# Patient Record
(demographics unavailable — no encounter records)

---

## 2025-04-24 NOTE — HISTORY OF PRESENT ILLNESS
[de-identified] : CHARMAINE LEONARDO is a 44 year male here with bilateral hip pain, R>L. Pain has been present since 5- 10 years but pain has been present since 04/22/25 and is located in the groin and posterior.  Rates pain as 2/10 on pain scale. Injury - No specific injury  Mechanical symptoms - States the right hip clicks depending on movement  Exacerbating factors/activities/positions - Bending- feels tightness that can radiate down to his right foot Pain during or after activity - during activity- Tightness more so the right hip Back pain - Yes lower back- tightness  Radicular pain - Tightness that can radiate down his right leg  Previous Treatment: [facility]- 3 years ago, PCP- referred to see an orthopedic  NSAIDs: Advil, Aleve  PT: None  Activity Mods: N/A  Surgery: None  Injections: None  Occupation:   Sports/Recreational Activities: N/A Additional: goes to the gym 5x a week. States he bought a hip brace 1.5 months ago which help stabilized his left hip.                                                                     *States he is a recovering drug addict*

## 2025-04-24 NOTE — IMAGING
[de-identified] : General: NAD, A&Ox3 Gait: Non-antalgic, no Trendelenburg Foot Progression: neutral Knee Progression: neutral   B/L Hip Exam: Pain with Log Roll - negative Flexion: 130 Pain with Hyperflexion - no FADIR - neg AMELIA - neg Extension: neg Flexion Contracture - none Prone Apprehension Test - neg Ischial tenderness - none Trochanteric tenderness - none   Abduction - 5/5, pain - no Adduction - 5 /5 Supine resisted SLR - 5 /5, pain - no Seated resisted hip flexion - 5 /5, pain - no Dorsiflexion 5/5 Plantarflexion 5/5 EHL 5/5 DP/PT 2+, foot is warm and well perfused      Prone Rotation Test - symmetric internal/external rotation bilaterally  Leg Lengths: symmetric   normal S1, S2 heard

## 2025-04-24 NOTE — DISCUSSION/SUMMARY
[de-identified] : Sacroiliitis and R hip impingement  PT script NSAIDs PRN pain - counseled on appropriate use and GI precautions F/u PRN All questions answered, agreeable with plan